# Patient Record
Sex: MALE | Race: BLACK OR AFRICAN AMERICAN | NOT HISPANIC OR LATINO | Employment: FULL TIME | URBAN - METROPOLITAN AREA
[De-identification: names, ages, dates, MRNs, and addresses within clinical notes are randomized per-mention and may not be internally consistent; named-entity substitution may affect disease eponyms.]

---

## 2019-06-20 ENCOUNTER — CONVERSION ENCOUNTER (OUTPATIENT)
Dept: SURGERY | Facility: CLINIC | Age: 34
End: 2019-06-20

## 2019-06-20 ENCOUNTER — OFFICE VISIT CONVERTED (OUTPATIENT)
Dept: UROLOGY | Facility: CLINIC | Age: 34
End: 2019-06-20
Attending: UROLOGY

## 2019-07-25 ENCOUNTER — PROCEDURE VISIT CONVERTED (OUTPATIENT)
Dept: UROLOGY | Facility: CLINIC | Age: 34
End: 2019-07-25
Attending: UROLOGY

## 2019-10-01 ENCOUNTER — OFFICE VISIT CONVERTED (OUTPATIENT)
Dept: UROLOGY | Facility: CLINIC | Age: 34
End: 2019-10-01
Attending: UROLOGY

## 2019-10-09 ENCOUNTER — HOSPITAL ENCOUNTER (OUTPATIENT)
Dept: CARDIOLOGY | Facility: HOSPITAL | Age: 34
Discharge: HOME | End: 2019-10-09
Attending: SPECIALIST

## 2021-05-15 VITALS — WEIGHT: 238 LBS | HEIGHT: 69 IN | RESPIRATION RATE: 14 BRPM | BODY MASS INDEX: 35.25 KG/M2

## 2021-05-15 VITALS — BODY MASS INDEX: 35.25 KG/M2 | WEIGHT: 238 LBS | HEIGHT: 69 IN | RESPIRATION RATE: 14 BRPM

## 2023-01-01 ENCOUNTER — HOSPITAL ENCOUNTER (EMERGENCY)
Facility: HOSPITAL | Age: 38
End: 2023-02-19
Attending: EMERGENCY MEDICINE | Admitting: EMERGENCY MEDICINE
Payer: MEDICAID

## 2023-01-01 DIAGNOSIS — I46.9 CARDIOPULMONARY ARREST: Primary | ICD-10-CM

## 2023-01-01 LAB
BASE EXCESS BLDV CALC-SCNC: -9.2 MMOL/L (ref -2–2)
BDY SITE: ABNORMAL
CA-I BLDA-SCNC: 1.27 MMOL/L (ref 1.13–1.32)
CHLORIDE BLDV-SCNC: 100 MMOL/L (ref 98–106)
COHGB MFR BLD: 0.3 % (ref 0–1.5)
FHHB: 92.4 % (ref 0–5)
GAS FLOW AIRWAY: 15 LPM
GLUCOSE BLDA-MCNC: 189 MG/DL (ref 70–99)
HCO3 BLDV-SCNC: 27.4 MMOL/L (ref 22–26)
HGB BLDA-MCNC: 14.9 G/DL (ref 13.8–16.4)
INHALED O2 CONCENTRATION: 100 %
LACTATE BLDA-SCNC: 9.29 MMOL/L (ref 0.5–2)
METHGB BLD QL: 2 % (ref 0–1.5)
MODALITY: ABNORMAL
NOTE: ABNORMAL
OXYHGB MFR BLDV: 5.3 % (ref 94–99)
PCO2 BLDV: 139.8 MM HG (ref 41–51)
PH BLDV: 6.91 PH UNITS (ref 7.31–7.41)
PO2 BLDV: ABNORMAL MM[HG]
POTASSIUM BLDV-SCNC: 4.3 MMOL/L (ref 3.5–5)
SAO2 % BLDCOV: ABNORMAL %
SODIUM BLDV-SCNC: 147.8 MMOL/L (ref 136–146)

## 2023-01-01 PROCEDURE — 36415 COLL VENOUS BLD VENIPUNCTURE: CPT

## 2023-01-01 PROCEDURE — 31500 INSERT EMERGENCY AIRWAY: CPT

## 2023-01-01 PROCEDURE — 82820 HEMOGLOBIN-OXYGEN AFFINITY: CPT | Performed by: EMERGENCY MEDICINE

## 2023-01-01 PROCEDURE — 99284 EMERGENCY DEPT VISIT MOD MDM: CPT

## 2023-01-01 PROCEDURE — 25010000002 AMIODARONE PER 30 MG: Performed by: EMERGENCY MEDICINE

## 2023-01-01 PROCEDURE — 82962 GLUCOSE BLOOD TEST: CPT

## 2023-01-01 PROCEDURE — 94799 UNLISTED PULMONARY SVC/PX: CPT

## 2023-01-01 PROCEDURE — 92950 HEART/LUNG RESUSCITATION CPR: CPT

## 2023-01-01 PROCEDURE — 25010000002 EPINEPHRINE 1 MG/10ML SOLUTION PREFILLED SYRINGE: Performed by: EMERGENCY MEDICINE

## 2023-01-01 PROCEDURE — 82805 BLOOD GASES W/O2 SATURATION: CPT | Performed by: EMERGENCY MEDICINE

## 2023-01-01 RX ORDER — AMIODARONE HYDROCHLORIDE 50 MG/ML
INJECTION, SOLUTION INTRAVENOUS
Status: COMPLETED | OUTPATIENT
Start: 2023-01-01 | End: 2023-01-01

## 2023-01-01 RX ORDER — EPINEPHRINE 0.1 MG/ML
SYRINGE (ML) INJECTION
Status: COMPLETED | OUTPATIENT
Start: 2023-01-01 | End: 2023-01-01

## 2023-01-01 RX ORDER — CALCIUM CHLORIDE 100 MG/ML
INJECTION INTRAVENOUS; INTRAVENTRICULAR
Status: COMPLETED | OUTPATIENT
Start: 2023-01-01 | End: 2023-01-01

## 2023-01-01 RX ADMIN — EPINEPHRINE 1 MG: 0.1 INJECTION INTRACARDIAC; INTRAVENOUS at 08:08

## 2023-01-01 RX ADMIN — CALCIUM CHLORIDE 1 G: 100 INJECTION INTRAVENOUS; INTRAVENTRICULAR at 08:14

## 2023-01-01 RX ADMIN — EPINEPHRINE 1 MG: 0.1 INJECTION INTRACARDIAC; INTRAVENOUS at 08:26

## 2023-01-01 RX ADMIN — EPINEPHRINE 1 MG: 0.1 INJECTION INTRACARDIAC; INTRAVENOUS at 08:14

## 2023-01-01 RX ADMIN — SODIUM BICARBONATE 50 MEQ: 84 INJECTION INTRAVENOUS at 08:14

## 2023-01-01 RX ADMIN — AMIODARONE HYDROCHLORIDE 300 MG: 50 INJECTION, SOLUTION INTRAVENOUS at 08:20

## 2023-01-01 RX ADMIN — SODIUM BICARBONATE 50 MEQ: 84 INJECTION INTRAVENOUS at 08:36

## 2023-01-01 RX ADMIN — EPINEPHRINE 1 MG: 0.1 INJECTION INTRACARDIAC; INTRAVENOUS at 08:17

## 2023-01-01 RX ADMIN — SODIUM BICARBONATE 50 MEQ: 84 INJECTION INTRAVENOUS at 08:25

## 2023-01-01 RX ADMIN — SODIUM BICARBONATE 50 MEQ: 84 INJECTION INTRAVENOUS at 08:21

## 2023-01-01 RX ADMIN — EPINEPHRINE 1 MG: 0.1 INJECTION INTRACARDIAC; INTRAVENOUS at 08:39

## 2023-01-01 RX ADMIN — EPINEPHRINE 1 MG: 0.1 INJECTION INTRACARDIAC; INTRAVENOUS at 08:11

## 2023-01-01 RX ADMIN — EPINEPHRINE 1 MG: 0.1 INJECTION INTRACARDIAC; INTRAVENOUS at 08:23

## 2023-01-01 RX ADMIN — EPINEPHRINE 1 MG: 0.1 INJECTION INTRACARDIAC; INTRAVENOUS at 08:20

## 2023-01-01 RX ADMIN — EPINEPHRINE 1 MG: 0.1 INJECTION INTRACARDIAC; INTRAVENOUS at 08:36

## 2023-01-01 RX ADMIN — EPINEPHRINE 1 MG: 0.1 INJECTION INTRACARDIAC; INTRAVENOUS at 08:33

## 2023-01-01 RX ADMIN — EPINEPHRINE 1 MG: 0.1 INJECTION INTRACARDIAC; INTRAVENOUS at 08:29

## 2023-01-01 RX ADMIN — SODIUM BICARBONATE 50 MEQ: 84 INJECTION INTRAVENOUS at 08:09

## 2023-01-01 RX ADMIN — SODIUM BICARBONATE 50 MEQ: 84 INJECTION INTRAVENOUS at 08:26

## 2023-02-19 NOTE — ED PROVIDER NOTES
Time: 8:08 AM EST  Date of encounter:  2/19/2023  Independent Historian/Clinical History and Information was obtained by:   EMS and wife    Chief Complaint: Cardiopulmonary arrest    History is limited by: Unresponsive    History of Present Illness:  Patient is a 37 y.o. year old male who presents to the emergency department for evaluation of witnessed arrest.  Patient was reportedly in his normal state of health this morning per his wife.  They were getting ready to go to [place] when he collapsed and went into cardiac arrest.  The patient's wife denies that he had any complaints.  She does report he has a history of congestive heart failure at around the age of 29 that had no recent issues regarding this to her knowledge.  She states that yesterday they were outside playing with her child and he had had no chest pain or shortness of breath during or afterwards.    Wife called 911.  Fire was first on scene.  AED advised defibrillation.  I believe per EMS report she was defibrillated 3 times prior to their arrival.  Upon EMS arrival he was found to be in ventricular fibrillation and was defibrillated another time.  An IO was placed in his lower extremity.  Supraglottic airway was also placed with bag-valve-mask ventilations.  CPR and ACLS protocol was followed.  Patient was transported to [facility] ER emergently.  Upon arrival patient is unresponsive in cardiac arrest.  History subsequently limited.          History provided by:  EMS personnel and relative  History limited by:  Patient unresponsive   used: No        Patient Care Team  Primary Care Provider: Provider, No Known    Past Medical History:     Past medical history per wife is significant for congestive heart failure.    No family history on file.    Home Medications:  Prior to Admission medications    Not on File        Social History:          Review of Systems:  Review of Systems   Unable to perform ROS: Patient  unresponsive        Physical Exam:  BP (!) 0/0   Pulse (!) 0   Resp (!) 0   SpO2 (!) 0%     Vital signs were reviewed under triage note.  General appearance - Patient appears well-developed and well-nourished.   Head - Normocephalic, atraumatic.  Pupils - Pupils are fixed and dilated.  Nonreactive.   Nasal - Normal inspection.  No evidence of trauma or epistaxis..  Oral mucosa - Pink and moist without lesions or erythema.  Uvula is midline.  Chest wall - Atraumatic.  There is no vesicular rashes noted.  Neck - Trachea was midline.  There is no palpable lymphadenopathy or thyromegaly.   Lungs - There is no spontaneous respirations.  Breath sounds are diminished with bagged ventilations.   Heart - There were no spontaneous heart sounds upon auscultation.  Femoral pulses are felt with CPR.    Abdomen - Soft.  There are no palpable masses.   Back - Was not assessed due to CPR Extremities - Intact x4.  There is no palpable edema.    Neurologic - Patient is unresponsive.  GCS is 3.  There is no neurological reflexes.    Integument - There are no rashes.  There are no petechia or purpura lesions noted.  There are no vesicular lesions noted.              Procedures:  Procedures  Intubation -patient was emergently intubated by me using a glide scope.  8.0 ET tube was utilized.  CPR was not paused for procedure.  Patient was intubated by me without medications.  I was able to visualize the vocal cords using the glide scope.  8.0 ET tube was inserted to appropriate depth using the vocal cord markers.  Please see RT notes for tube depth.  There was positive end-tidal CO2 change detected.  There is also positive breath sounds upon auscultation.  Tube was secured by RT.  Patient was intubated on the first attempt.    Cardiac ultrasound was performed several times during CPR for pulse checks and confirmation at time of death.  At no point was there any cardiac activity visualized.  The patient's left ventricle was visualized to  be thickened in nature.  There is no pericardial effusion noted.    Medical Decision Making:      Comorbidities that affect care:    Congestive heart failure    External Notes reviewed:    None and EMS Run sheet: Patient found to be in V-fib.  Defibrillated.  IO was placed.  CPR was initiated.  Supraglottic airway was placed with bagged ventilations.  Patient was given 1 round of bicarb and 1 round of epinephrine in route.      The following orders were placed and all results were independently analyzed by me:  Orders Placed This Encounter   Procedures   • VBG with Co-Ox and Electrolytes       Medications Given in the Emergency Department:  Medications   EPINEPHrine (ADRENALIN) injection (1 mg Intravenous Given 2/19/23 0839)   sodium bicarbonate injection 8.4% ( Intravenous Canceled Entry 2/19/23 0845)   calcium chloride injection (1 g Intravenous Given 2/19/23 0814)   amiodarone (CORDARONE) injection (300 mg Intravenous Given 2/19/23 0820)        ED Course:     The patient arrived to the ER and cardiopulmonary arrest.  ACLS was in progress by EMS.  Peripheral IV was established by nursing staff.  ACLS protocol was followed.  Patient was intubated by me.  Patient at one point was in V-fib and was defibrillated.  Despite all efforts, at no point was ROSC obtained.  The patient's wife, mother, and father were in the resuscitation room during resuscitation.  Patient was pronounced dead by me after approximately 70 minutes of combined resuscitative efforts.  Cardiac FAST exam revealed no cardiac contractility.  Time of death was 842.    Labs:    Lab Results (last 24 hours)     Procedure Component Value Units Date/Time    VBG with Co-Ox and Electrolytes [[id number]]  (Abnormal) Collected: 02/19/23 1302    Specimen: Venous Blood Updated: 02/19/23 1309     pH, Venous 6.910 pH Units      pCO2, Venous 139.8 mm Hg      pO2, Venous --     Comment: O2 too low to read        HCO3, Venous 27.4 mmol/L      Base Excess, Venous -9.2  mmol/L      O2 Saturation, Venous --     Comment: O2 too low to read        Hemoglobin, Blood Gas 14.9 g/dL      Carboxyhemoglobin 0.3 %      Methemoglobin 2.00 %      Oxyhemoglobin 5.3 %      FHHB 92.4 %      Note --     Site Venous     Modality Ambu bag     FIO2 100 %      Flow Rate 15 lpm      Sodium, Venous 147.8 mmol/L      Potassium, Venous 4.3 mmol/L      Ionized Calcium, Arterial 1.27 mmol/L      Chloride, Venous  100 mmol/L      Glucose, Arterial 189 mg/dL      Lactate, Arterial 9.29 mmol/L            Imaging:    No Radiology Exams Resulted Within Past 24 Hours      Differential Diagnosis and Discussion:    Cardiac Arrest: Differential diagnosis includes but is not limited to ventricular tachycardia, ventricular fibrillation, asystole, PEA, respiratory arrest due to hypoxia or metabolic abnormalities.    All labs were reviewed and interpreted by me.    [facility]     Critical Care Note: Total Critical Care time of 35 minutes. Total critical care time documented does not include time spent on separately billed procedures for services of nurses or physician assistants. I personally saw and examined the patient. I have reviewed all diagnostic interpretations and treatment plans as written. I was present for the key portions of any procedures performed and the inclusive time noted in any critical care statement. Critical care time includes patient management by me, time spent at the patients bedside,  time to review lab and imaging results, discussing patient care, documentation in the medical record, and time spent with family or caregiver.    Patient Care Considerations:          Consultants/Shared Management Plan:    None    Social Determinants of Health:    The patient lives with his wife.      Disposition and Care Coordination:            Final diagnoses:   Cardiopulmonary arrest (HCC)        ED Disposition     ED Disposition       Condition   --    Comment   --             This medical record created  using voice recognition software.        Documentation assistance provided by Muriel Moon acting as scribe for No att. providers found. Information recorded by the scribe was done at my direction and has been verified and validated by me.          Muriel Moon  02/19/23 0937       Lucas Holcomb DO  02/20/23 1031

## 2023-02-20 LAB — GLUCOSE BLDC GLUCOMTR-MCNC: 138 MG/DL (ref 70–99)
